# Patient Record
Sex: FEMALE | Race: WHITE
[De-identification: names, ages, dates, MRNs, and addresses within clinical notes are randomized per-mention and may not be internally consistent; named-entity substitution may affect disease eponyms.]

---

## 2017-07-19 ENCOUNTER — HOSPITAL ENCOUNTER (EMERGENCY)
Dept: HOSPITAL 10 - FTE | Age: 27
LOS: 1 days | Discharge: HOME | End: 2017-07-20
Payer: COMMERCIAL

## 2017-07-19 VITALS
WEIGHT: 151.02 LBS | HEIGHT: 64 IN | BODY MASS INDEX: 25.78 KG/M2 | WEIGHT: 151.02 LBS | BODY MASS INDEX: 25.78 KG/M2 | HEIGHT: 64 IN

## 2017-07-19 DIAGNOSIS — R10.84: Primary | ICD-10-CM

## 2017-07-19 DIAGNOSIS — K59.00: ICD-10-CM

## 2017-07-19 DIAGNOSIS — R10.2: ICD-10-CM

## 2017-07-19 LAB
ADD SCAN DIFF: NO
ADD UMIC: YES
ALBUMIN SERPL-MCNC: 4.1 G/DL (ref 3.3–4.9)
ALBUMIN/GLOB SERPL: 1.24 {RATIO}
ALP SERPL-CCNC: 73 IU/L (ref 42–121)
ALT SERPL-CCNC: 36 IU/L (ref 13–69)
ANION GAP SERPL CALC-SCNC: 17 MMOL/L (ref 8–16)
AST SERPL-CCNC: 22 IU/L (ref 15–46)
BASOPHILS # BLD AUTO: 0 10^3/UL (ref 0–0.1)
BASOPHILS NFR BLD: 0.4 % (ref 0–2)
BILIRUB DIRECT SERPL-MCNC: 0 MG/DL (ref 0–0.2)
BILIRUB SERPL-MCNC: 0.3 MG/DL (ref 0.2–1.3)
BUN SERPL-MCNC: 9 MG/DL (ref 7–20)
CALCIUM SERPL-MCNC: 9.1 MG/DL (ref 8.4–10.2)
CHLORIDE SERPL-SCNC: 104 MMOL/L (ref 97–110)
CO2 SERPL-SCNC: 25 MMOL/L (ref 21–31)
COLOR UR: YELLOW
CREAT SERPL-MCNC: 0.63 MG/DL (ref 0.44–1)
EOSINOPHIL # BLD: 0.2 10^3/UL (ref 0–0.5)
EOSINOPHIL NFR BLD: 1.9 % (ref 0–7)
ERYTHROBLAST% (NRBC) (M): 0 /100WBC (ref 0–0)
ERYTHROCYTE [DISTWIDTH] IN BLOOD BY AUTOMATED COUNT: 13.2 % (ref 11.5–14.5)
GLOBULIN SER-MCNC: 3.3 G/DL (ref 1.3–3.2)
GLUCOSE SERPL-MCNC: 94 MG/DL (ref 70–220)
GLUCOSE UR STRIP-MCNC: NEGATIVE MG/DL
HCT VFR BLD CALC: 34.7 % (ref 37–47)
HGB BLD-MCNC: 11.3 G/DL (ref 12–16)
KETONES UR STRIP.AUTO-MCNC: NEGATIVE MG/DL
LYMPHOCYTES # BLD AUTO: 3.6 10^3/UL (ref 0.8–2.9)
LYMPHOCYTES NFR BLD AUTO: 39.5 % (ref 15–51)
MCH RBC QN AUTO: 27.5 PG (ref 29–33)
MCHC RBC AUTO-ENTMCNC: 32.6 G/DL (ref 32–37)
MCV RBC AUTO: 84.4 FL (ref 82–101)
MONOCYTES # BLD: 0.5 10^3/UL (ref 0.3–0.9)
MONOCYTES NFR BLD: 5.4 % (ref 0–11)
NEUTROPHILS # BLD: 4.8 10^3/UL (ref 1.6–7.5)
NEUTROPHILS NFR BLD AUTO: 52.6 % (ref 39–77)
NITRITE UR QL STRIP.AUTO: NEGATIVE MG/DL
NRBC # BLD MANUAL: 0 10^3/UL (ref 0–0)
PLATELET # BLD: 267 10^3/UL (ref 140–415)
PMV BLD AUTO: 10.4 FL (ref 7.4–10.4)
POTASSIUM SERPL-SCNC: 3.8 MMOL/L (ref 3.5–5.1)
PROT SERPL-MCNC: 7.4 G/DL (ref 6.1–8.1)
RBC # BLD AUTO: 4.11 10^6/UL (ref 4.2–5.4)
RBC # UR AUTO: (no result) MG/DL
SODIUM SERPL-SCNC: 142 MMOL/L (ref 135–144)
SQUAMOUS #/AREA URNS HPF: (no result) /HPF
UR ASCORBIC ACID: NEGATIVE MG/DL
UR BILIRUBIN (DIP): NEGATIVE MG/DL
UR CLARITY: CLEAR
UR PH (DIP): 6 (ref 5–9)
UR RBC: 0 /HPF (ref 0–5)
UR SPECIFIC GRAVITY (DIP): 1.01 (ref 1–1.03)
UR TOTAL PROTEIN (DIP): NEGATIVE MG/DL
UROBILINOGEN UR STRIP-ACNC: NEGATIVE MG/DL
WBC # BLD AUTO: 9.1 10^3/UL (ref 4.8–10.8)
WBC # UR STRIP: NEGATIVE LEU/UL

## 2017-07-19 PROCEDURE — 83690 ASSAY OF LIPASE: CPT

## 2017-07-19 PROCEDURE — 81001 URINALYSIS AUTO W/SCOPE: CPT

## 2017-07-19 PROCEDURE — 80053 COMPREHEN METABOLIC PANEL: CPT

## 2017-07-19 PROCEDURE — 36415 COLL VENOUS BLD VENIPUNCTURE: CPT

## 2017-07-19 PROCEDURE — 74176 CT ABD & PELVIS W/O CONTRAST: CPT

## 2017-07-19 PROCEDURE — 85025 COMPLETE CBC W/AUTO DIFF WBC: CPT

## 2017-07-19 PROCEDURE — 76856 US EXAM PELVIC COMPLETE: CPT

## 2017-07-19 NOTE — RADRPT
PROCEDURE:    CT ABDOMEN/PELVIS WITHOUT CONTRAST  

 

CLINICAL INDICATION:   27 year-old female with epigastric and left lower quadrant pain. 

 

TECHNIQUE:   The study was performed utilizing a GE [employer] Software VCT 64-slice CT scanner.  Direct axia
l sections were obtained through the abdomen and pelvis without the use of intravenous contrast mate
rial. Sagittal and coronal reformations were obtained. One or more of the following dose reduction t
echniques were utilized: automated exposure control, adjustment of the mA and/or kV according to pat
ient's size or use of iterative reconstruction technique.  The images were reviewed on a PACS workst
ation.   CTD/vol = 9.6 mGy; Total Exam DLP = 494.6 mGy-cm. 

 

COMPARISON:    None. 

 

FINDINGS:

 

The lung bases are unremarkable.  There is no evidence for significant pleural effusion.  The liver 
has a normal size and contour without focal areas of abnormal density. No intrahepatic nor extrahepa
tic biliary ductal dilatation is seen. Surgical clips are seen within the gallbladder fossa from ridge
or cholecystectomy. The pancreas is without areas of abnormal attenuation.  The spleen is identified
 and has a normal size without abnormal density. The adrenal glands are unremarkable. The kidneys ar
e without abnormal density. No hydroureteronephrosis nor nephroureterolithiasis is evident. The urin
alice bladder contains a small volume of urine. There is mild retained stool within the colon without 
obstruction..  The appendix is visualized and is without abnormal thickening or surrounding inflamma
tory reaction. The uterus appears to be enlarged and elongated measuring approximately 11.5 x 4.7 x 
6.0 cm. There is no significant free fluid.  The aortoiliac vessels are without aneurysmal 

dilatation. Mild degenerative changes are seen within the spine.  There is a Schmorl's node involvin
g the inferior L1 vertebral body.  There appears to be a limbus vertebra within the anterosuperior L
4 vertebral body.

 

IMPRESSION:

 

1.  Status post cholecystectomy.

2.  No CT evidence for obstructive uropathy or renal calculi.

3.  Retained stool without gross bowel obstruction.

4.  Elongated mildly enlarged uterus.

5.  Mild degenerative changes within the spine.

_____________________________________________ 

.Ricky Dominguez MD, MD           Date    Time 

Electronically viewed and signed by .Ricky Dominguez MD, MD on 07/19/2017 23:00 

 

D:  07/19/2017 23:00  T:  07/19/2017 23:00

.SANTHOSH

## 2017-07-19 NOTE — ERD
ER Documentation


Chief Complaint


Date/Time


DATE: 17 


TIME: 21:44


Chief Complaint


upper abd pain radaiting to back x 1 month





HPI


27-year-old female presents in emergency department for complaint of epigastric 

pain that radiates to the back started one month ago, patient saw her primary 

care doctor, was told to possibly have gastritis, patient has an appointment 

with a GI specialist in the end of the month but today, her pain is worse, 8/10 

scale, she's been taking omeprazole with only mild relief. Patient's complete 

of nausea but denies any vomiting. Patient's pain is worse after eating. Today 

also, patient started to have left lower quadrant abdominal pain sharp pains 6/

10 scale, accompanied with the other symptoms. Patient denies any flank pain or 

hematuria or dysuria. Patient denies any constipation or diarrhea. Patient 

denies any blood in stool or black stool.





ROS


All systems reviewed and are negative except as per history of present illness.





Medications


Home Meds


Active Scripts


Ibuprofen* (Motrin*) 600 Mg Tab, 600 MG PO Q8, #20


   Prov:GARETT SALAZAR         10/2/15


Cyclobenzaprine Hcl* (Cyclobenzaprine Hcl*) 10 Mg Tablet, 10 MG PO TID, #15 TAB


   Prov:DAY MOCK         8/23/15


Ibuprofen* (Motrin*) 600 Mg Tab, 600 MG PO Q6, #30 TAB


   Prov:DAY MOCK         8/23/15


Reported Medications


Prenatal Vit-Iron Fumarate-FA (Prenatal Vitamin Tablet) 1 Each Tablet, 1 TAB PO 

DAILY, TAB


   16





Allergies


Allergies:  


Coded Allergies:  


     No Known Allergy (Unverified , 16)





PMhx/Soc


History of Surgery:  Yes (; OVARIAN MASS; LEFT LEG; RIGHT LEG. 

Cholecystectomy)


Anesthesia Reaction:  No


Hx Neurological Disorder:  No


Hx Respiratory Disorders:  No


Hx Cardiac Disorders:  No


Hx Psychiatric Problems:  No


Hx Miscellaneous Medical Probl:  Yes (gallstones)


Hx Alcohol Use:  No


Hx Substance Use:  No


Hx Tobacco Use:  No


Smoking Status:  Never smoker





FmHx


Family History:  No coronary disease, No diabetes, No other





Physical Exam


Vitals





Vital Signs








  Date Time  Temp Pulse Resp B/P Pulse Ox O2 Delivery O2 Flow Rate FiO2


 


17 18:51 98.2 90 20 122/81 100   








Physical Exam


GENERAL:  The patient is well developed and appropriate for usual state of 

health, in no apparent distress.


CHEST:  Clear to auscultation bilaterally. There are no rales, wheezes or 

rhonchi. 


HEART:  Regular rate and rhythm. No murmurs, clicks, rubs or gallops. No S3 or 

S4.


ABDOMEN:  Soft, nontender and nondistended. Good bowel sounds. No rebound or 

guarding. No gross peritonitis. No gross organomegaly or masses. No Parker sign 

or McBurney point tenderness.


BACK:  No midline or flank tenderness.


EXTREMITIES:  Equal pulses bilaterally. There is no peripheral clubbing, 

cyanosis or edema. No focal swelling or erythema. Full range of motion. Grossly 

neurovascularly intact.


NEURO:  Alert and oriented. Cranial nerves 2-12 intact. Motor strength in all 4 

extremities with 5/5 strength.  Sensation grossly intact. Normal speech and 

gait. 


SKIN:  There is no apparent rash or petechia. The skin is warm and dry.


HEMATOLOGIC AND LYMPHATIC:  There is no evidence of excessive bruising or 

lymphedema. No gross cervical, axillary, or inguinal lymphadenopathy.


Result Diagram:  


17





Results 24 hrs





 Laboratory Tests








Test


  17


21:30 17


21:53


 


Urine Color YELLOW  


 


Urine Clarity CLEAR  


 


Urine pH 6.0  


 


Urine Specific Gravity 1.012  


 


Urine Ketones NEGATIVEmg/dL  


 


Urine Nitrite NEGATIVEmg/dL  


 


Urine Bilirubin NEGATIVEmg/dL  


 


Urine Urobilinogen NEGATIVEmg/dL  


 


Urine Leukocyte Esterase NEGATIVELeu/ul  


 


Urine Microscopic RBC 0/HPF  


 


Urine Microscopic WBC 0/HPF  


 


Urine Squamous Epithelial


Cells FEW/HPF 


  


 


 


Urine Hemoglobin 1+mg/dL  


 


Urine Glucose NEGATIVEmg/dL  


 


Urine Total Protein NEGATIVEmg/dl  


 


White Blood Count  9.110^3/ul 


 


Red Blood Count  4.1110^6/ul 


 


Hemoglobin  11.3g/dl 


 


Hematocrit  34.7% 


 


Mean Corpuscular Volume  84.4fl 


 


Mean Corpuscular Hemoglobin  27.5pg 


 


Mean Corpuscular Hemoglobin


Concent 


  32.6g/dl 


 


 


Red Cell Distribution Width  13.2% 


 


Platelet Count  15946^3/UL 


 


Mean Platelet Volume  10.4fl 


 


Neutrophils %  52.6% 


 


Lymphocytes %  39.5% 


 


Monocytes %  5.4% 


 


Eosinophils %  1.9% 


 


Basophils %  0.4% 


 


Nucleated Red Blood Cells %  0.0/100WBC 


 


Neutrophils #  4.810^3/ul 


 


Lymphocytes #  3.610^3/ul 


 


Monocytes #  0.510^3/ul 


 


Eosinophils #  0.210^3/ul 


 


Basophils #  0.010^3/ul 


 


Nucleated Red Blood Cells #  0.010^3/ul 


 


Sodium Level  142mmol/L 


 


Potassium Level  3.8mmol/L 


 


Chloride Level  104mmol/L 


 


Carbon Dioxide Level  25mmol/L 


 


Anion Gap  17 


 


Blood Urea Nitrogen  9mg/dl 


 


Creatinine  0.63mg/dl 


 


Glucose Level  94mg/dl 


 


Calcium Level  9.1mg/dl 


 


Total Bilirubin  0.3mg/dl 


 


Direct Bilirubin  0.00mg/dl 


 


Indirect Bilirubin  0.3mg/dl 


 


Aspartate Amino Transf


(AST/SGOT) 


  22IU/L 


 


 


Alanine Aminotransferase


(ALT/SGPT) 


  36IU/L 


 


 


Alkaline Phosphatase  73IU/L 


 


Total Protein  7.4g/dl 


 


Albumin  4.1g/dl 


 


Globulin  3.30g/dl 


 


Albumin/Globulin Ratio  1.24 


 


Lipase  54U/L 








 Current Medications








 Medications


  (Trade)  Dose


 Ordered  Sig/Larissa


 Route


 PRN Reason  Start Time


 Stop Time Status Last Admin


Dose Admin


 


 Tramadol HCl


  (Ultram)  50 mg  ONCE  ONCE


 PO


   17 23:30


 17 23:31 DC 17 23:28


 





PROCEDURE:    CT ABDOMEN/PELVIS WITHOUT CONTRAST  


 


CLINICAL INDICATION:   27 year-old female with epigastric and left lower 

quadrant pain. 


 


TECHNIQUE:   The study was performed utilizing a GE LightSpeed VCT 64-slice CT 

scanner.  Direct axial sections were obtained through the abdomen and pelvis 

without the use of intravenous contrast material. Sagittal and coronal 

reformations were obtained. One or more of the following dose reduction 

techniques were utilized: automated exposure control, adjustment of the mA and/

or kV according to patient's size or use of iterative reconstruction technique.

  The images were reviewed on a PACS workstation.   CTD/vol = 9.6 mGy; Total 

Exam DLP = 494.6 mGy-cm. 


 


COMPARISON:    None. 


 


FINDINGS:


 


The lung bases are unremarkable.  There is no evidence for significant pleural 

effusion.  The liver has a normal size and contour without focal areas of 

abnormal density. No intrahepatic nor extrahepatic biliary ductal dilatation is 

seen. Surgical clips are seen within the gallbladder fossa from prior 

cholecystectomy. The pancreas is without areas of abnormal attenuation.  The 

spleen is identified and has a normal size without abnormal density. The 

adrenal glands are unremarkable. The kidneys are without abnormal density. No 

hydroureteronephrosis nor nephroureterolithiasis is evident. The urinary 

bladder contains a small volume of urine. There is mild retained stool within 

the colon without obstruction..  The appendix is visualized and is without 

abnormal thickening or surrounding inflammatory reaction. The uterus appears to 

be enlarged and elongated measuring approximately 11.5 x 4.7 x 6.0 cm. There is 

no significant free fluid.  The aortoiliac vessels are without aneurysmal 


dilatation. Mild degenerative changes are seen within the spine.  There is a 

Schmorl's node involving the inferior L1 vertebral body.  There appears to be a 

limbus vertebra within the anterosuperior L4 vertebral body.


 


IMPRESSION:


 


1.  Status post cholecystectomy.


2.  No CT evidence for obstructive uropathy or renal calculi.


3.  Retained stool without gross bowel obstruction.


4.  Elongated mildly enlarged uterus.


5.  Mild degenerative changes within the spine.


_____________________________________________ 


.Ricky Dominguez MD, MD           Date    Time 


Electronically viewed and signed by .Ricky Dominguez MD, MD on 2017 23:00 


 


D:  2017 23:00  T:  2017 23:00


.M/





CC: MADHURI IVAN NP


PROCEDURE:   US Pelvis. 


 


CLINICAL INDICATION:   Pelvic pain


 


TECHNIQUE:   Multiple sonographic images of the pelvis were obtained utilizing 

a transabdominal  technique.   The images were reviewed on a PACS workstation. 


 


COMPARISON:   CT abdomen and pelvis 2017


 


FINDINGS:


 


Uterus: Elongated but without evidence of mass and homogeneous normal 

echotexture  Size is estimated at 12.6 x 5.9 x 4 cm.


 


Cervix:  No abnormalities of significance are seen.


 


Endometrium:  Normal in thickness; 9.8 mm. Trace amount of anechoic fluid is 

present


 


Right ovary / adnexa:  Normal in size estimated at 3.5 x 2.7 x 2 cm.  No 

evidence for masses, normal blood flow on Doppler interrogation.


 


Left ovary/adnexa: Normal in size estimated at 4.7 x 3.4 x 2.5 cm.  No evidence 

for solid masses, normal blood flow on Doppler interrogation. Simple ovarian 

cyst estimated at 2 x 1.8 x 1.7


 


Cul-de-sac:  No evidence of free fluid.  


 


 


RPTAT:HJJR


 


IMPRESSION:


 


1.  Elongated uterus corresponds to the CT findings but there is no evidence of 

myometrial mass.


2.  Simple left ovarian cyst of 2 cm without evidence of free fluid.


_____________________________________________ 


Physician Kaylyn           Date    Time 


Electronically viewed and signed by Gentry Barreto Physician on 2017 00:22 


 


D:  2017 00:22  T:  2017 00:22


JR/





CC: MADHURI IVAN NP





Procedures/MDM


Medical Decision Making: Patient epigastric pain most likely consistent with 

gastritis, patient has an appointment with GI specialist at the end of the 

month for further evaluation and possible EGD. No symptoms of pancreatitis, no 

symptoms of choledocholithiasis. Liver function symptoms are normal, lipase is 

normal. Nonspecific left lower quadrant abdominal pain, can be radiation from 

the gastritis, no diverticulitis noted. Patient is also constipated which may 

be causing her pain. There is low suspicion for abdominal emergencies at this 

time. Patients abdominal exam is normal at this time. Patients radiology exam 

does not show any abdominal emergencies at this time. There is low suspicion 

for appendicitis, cholecystitis, abdominal aortic aneurysms or peritonitis at 

this time.  There is low suspicion for sepsis. Patient appears well and is 

hemodynamically stable.





Disposition: Home. Condition: Stable


Prescription tramadol, Colace, MiraLAX, Mylanta, continue omeprazole


Instructions: Patient is advised to take medications as prescribed. Patient is 

advised to rest, increase fluid intake and do brat diet for next 1-2 days and 

progress as tolerated. Patient is advised that if symptoms are worse, severe 

abdominal pain, uncontrolled vomiting, high fever, severe flank pain, worst 

signs and symptoms, to return to the emergency department immediately.  

Otherwise, patient can follow up with primary care doctor in 5-7 days. see a GI 

specialist as per appointment.








Disclaimer: Inadvertent spelling and grammatical errors are likely due to EHR/

dictation software use and do not reflect on the overall quality of patient 

care. Also, please note that the electronic time recorded on this note does not 

necessarily reflect the actual time of the patient encounter.





Departure


Diagnosis:  


 Primary Impression:  


 Abdominal pain


 Abdominal location:  generalized  Qualified Code:  R10.84 - Generalized 

abdominal pain


 Additional Impression:  


 Constipation


 Constipation type:  unspecified constipation type  Qualified Code:  K59.00 - 

Constipation, unspecified constipation type


Condition:  Stable


Patient Instructions:  Abdominal Pain, Constipation (Adult), Epigastric Pain (

Uncertain Cause)





Additional Instructions:  


Patient is advised to take medications as prescribed. Patient is advised to rest

, increase fluid intake and do brat diet for next 1-2 days and progress as 

tolerated. Patient is advised that if symptoms are worse, severe abdominal pain

, uncontrolled vomiting, high fever, severe flank pain, worst signs and symptoms

, to return to the emergency department immediately.  Otherwise, patient can 

follow up with primary care doctor in 5-7 days. see a GI specialist











MADHURI IVAN NP 2017 21:45

## 2017-07-20 VITALS — RESPIRATION RATE: 16 BRPM | HEART RATE: 74 BPM | SYSTOLIC BLOOD PRESSURE: 141 MMHG | DIASTOLIC BLOOD PRESSURE: 67 MMHG

## 2017-07-20 NOTE — RADRPT
PROCEDURE:   US Pelvis. 

 

CLINICAL INDICATION:   Pelvic pain

 

TECHNIQUE:   Multiple sonographic images of the pelvis were obtained utilizing a transabdominal  tram
hnique.   The images were reviewed on a PACS workstation. 

 

COMPARISON:   CT abdomen and pelvis 07/19/2017

 

FINDINGS:

 

Uterus: Elongated but without evidence of mass and homogeneous normal echotexture  Size is estimated
 at 12.6 x 5.9 x 4 cm.

 

Cervix:  No abnormalities of significance are seen.

 

Endometrium:  Normal in thickness; 9.8 mm. Trace amount of anechoic fluid is present

 

Right ovary / adnexa:  Normal in size estimated at 3.5 x 2.7 x 2 cm.  No evidence for masses, normal
 blood flow on Doppler interrogation.

 

Left ovary/adnexa: Normal in size estimated at 4.7 x 3.4 x 2.5 cm.  No evidence for solid masses, no
rmal blood flow on Doppler interrogation. Simple ovarian cyst estimated at 2 x 1.8 x 1.7

 

Cul-de-sac:  No evidence of free fluid.  

 

 

RPTAT:HJJR

 

IMPRESSION:

 

1.  Elongated uterus corresponds to the CT findings but there is no evidence of myometrial mass.

2.  Simple left ovarian cyst of 2 cm without evidence of free fluid.

_____________________________________________ 

Physician Kaylyn           Date    Time 

Electronically viewed and signed by Physician Kaylyn on 07/20/2017 00:22 

 

D:  07/20/2017 00:22  T:  07/20/2017 00:22

JR/

## 2017-08-29 ENCOUNTER — HOSPITAL ENCOUNTER (OUTPATIENT)
Dept: HOSPITAL 10 - GIL | Age: 27
Discharge: HOME | End: 2017-08-29
Attending: INTERNAL MEDICINE
Payer: COMMERCIAL

## 2017-08-29 VITALS — HEART RATE: 68 BPM | DIASTOLIC BLOOD PRESSURE: 73 MMHG | RESPIRATION RATE: 22 BRPM | SYSTOLIC BLOOD PRESSURE: 118 MMHG

## 2017-08-29 VITALS — RESPIRATION RATE: 14 BRPM | DIASTOLIC BLOOD PRESSURE: 60 MMHG | SYSTOLIC BLOOD PRESSURE: 113 MMHG | HEART RATE: 69 BPM

## 2017-08-29 VITALS — BODY MASS INDEX: 28.72 KG/M2 | WEIGHT: 152.12 LBS | HEIGHT: 61 IN

## 2017-08-29 DIAGNOSIS — K29.60: Primary | ICD-10-CM

## 2017-08-29 PROCEDURE — 84703 CHORIONIC GONADOTROPIN ASSAY: CPT

## 2017-08-29 PROCEDURE — 87081 CULTURE SCREEN ONLY: CPT

## 2017-08-29 PROCEDURE — 43239 EGD BIOPSY SINGLE/MULTIPLE: CPT

## 2017-08-29 NOTE — GILP
DATE OF PROCEDURE:  08/29/2017

 

NAME OF PROCEDURE:  Esophagogastroduodenoscopy and biopsy.

 

SURGEON:  Bryanna Hahn MD

 

PREOPERATIVE DIAGNOSIS:  Abdominal pain.

 

POSTOPERATIVE DIAGNOSES:

1.   Gastritis with erosions.

2.   Gastric mucosal biopsies were taken for Helicobacter pylori

test.

 

INDICATION:  The patient is a 27-year-old female patient who had

upper abdominal pain, not responding to therapy.  The patient was

scheduled for endoscopic examination for further evaluation.

 

The procedure and possible complications were well explained to

the patient.  The patient understood and consented to the

procedure.

 

DESCRIPTION OF PROCEDURE:  Under the influence of fentanyl and

Versed, the gastroscope was carefully introduced into the

esophagus.  Under direct vision, it was advanced to the stomach,

into the pylorus, into the duodenal bulb, and descending

duodenum.

 

FINDINGS:  Esophagus, mucosa was normal.  Stomach, the patient

had gastritis with erosions.  Gastric mucosal biopsies were taken

for Helicobacter pylori test.  Duodenum was normal.

 

She tolerated the procedure very well.  There was no complication

from the procedure.  At the end of procedure, she was awake with

stable vital signs and she was discharged home in the care of her

family.

 

IMPRESSION:

1.   Gastritis with erosions.

2.   Gastric mucosal biopsies were taken for Helicobacter pylori

test.

 

PLAN:

1.   Continue omeprazole.

2.   Add Zantac 300 mg p.o. q.h.s.

3.   Await Helicobacter pylori test report.

 

 

 

Dictated By:  MD VIDHI Cordova/[place]/ak

Job#:  [id number]/Document#:  [id number]

D:  08/29/2017 07:50

T:  08/29/2017 08:09

## 2017-08-29 NOTE — OPPN
Date/Time of Note


Date/Time of Note


DATE: 8/29/17 


TIME: 07:46





Operative Report


Preoperative Diagnosis


Abdominal pain


Postoperative Diagnosis


Gastritis with erosion


Operation/Procedure Performed


Esophagogastroduodenoscopy and biopsy


Provider:  VETO PEREZ MD


Anesthesia Type:  moderate sedation


Estimated blood loss:  none


Transfusion Required:  no


Specimens


Gastric mucosal biopsy


Grafts/Implants:  none


Complications:  no











VETO PEREZ MD Aug 29, 2017 07:47

## 2018-01-01 ENCOUNTER — HOSPITAL ENCOUNTER (EMERGENCY)
Dept: HOSPITAL 91 - FTE | Age: 28
LOS: 1 days | Discharge: HOME | End: 2018-01-02
Payer: COMMERCIAL

## 2018-01-01 ENCOUNTER — HOSPITAL ENCOUNTER (EMERGENCY)
Age: 28
LOS: 1 days | Discharge: HOME | End: 2018-01-02

## 2018-01-01 DIAGNOSIS — R11.0: ICD-10-CM

## 2018-01-01 DIAGNOSIS — R10.84: Primary | ICD-10-CM

## 2018-01-01 LAB
ADD MAN DIFF?: NO
ADD UMIC: YES
ALANINE AMINOTRANSFERASE: 40 IU/L (ref 13–69)
ALBUMIN/GLOBULIN RATIO: 1.39
ALBUMIN: 4.6 G/DL (ref 3.3–4.9)
ALKALINE PHOSPHATASE: 82 IU/L (ref 42–121)
ANION GAP: 17 (ref 8–16)
ASPARTATE AMINO TRANSFERASE: 31 IU/L (ref 15–46)
BASOPHIL #: 0 10^3/UL (ref 0–0.1)
BASOPHILS %: 0.2 % (ref 0–2)
BILIRUBIN,DIRECT: 0 MG/DL (ref 0–0.2)
BILIRUBIN,TOTAL: 0.4 MG/DL (ref 0.2–1.3)
BLOOD UREA NITROGEN: 13 MG/DL (ref 7–20)
CALCIUM: 9.2 MG/DL (ref 8.4–10.2)
CARBON DIOXIDE: 24 MMOL/L (ref 21–31)
CHLORIDE: 106 MMOL/L (ref 97–110)
CREATININE: 0.73 MG/DL (ref 0.44–1)
EOSINOPHILS #: 0.1 10^3/UL (ref 0–0.5)
EOSINOPHILS %: 0.5 % (ref 0–7)
GLOBULIN: 3.3 G/DL (ref 1.3–3.2)
GLUCOSE: 104 MG/DL (ref 70–220)
HEMATOCRIT: 37.3 % (ref 37–47)
HEMOGLOBIN: 12.5 G/DL (ref 12–16)
LIPASE: 59 U/L (ref 23–300)
LYMPHOCYTES #: 2.8 10^3/UL (ref 0.8–2.9)
LYMPHOCYTES %: 29.3 % (ref 15–51)
MEAN CORPUSCULAR HEMOGLOBIN: 28.1 PG (ref 29–33)
MEAN CORPUSCULAR HGB CONC: 33.5 G/DL (ref 32–37)
MEAN CORPUSCULAR VOLUME: 83.8 FL (ref 82–101)
MEAN PLATELET VOLUME: 10.4 FL (ref 7.4–10.4)
MONOCYTE #: 0.5 10^3/UL (ref 0.3–0.9)
MONOCYTES %: 5.5 % (ref 0–11)
NEUTROPHIL #: 6 10^3/UL (ref 1.6–7.5)
NEUTROPHILS %: 64.2 % (ref 39–77)
NUCLEATED RED BLOOD CELLS #: 0 10^3/UL (ref 0–0)
NUCLEATED RED BLOOD CELLS%: 0 /100WBC (ref 0–0)
PLATELET COUNT: 248 10^3/UL (ref 140–415)
POTASSIUM: 4.4 MMOL/L (ref 3.5–5.1)
RED BLOOD COUNT: 4.45 10^6/UL (ref 4.2–5.4)
RED CELL DISTRIBUTION WIDTH: 13.2 % (ref 11.5–14.5)
SODIUM: 143 MMOL/L (ref 135–144)
TOTAL PROTEIN: 7.9 G/DL (ref 6.1–8.1)
UR ASCORBIC ACID: 40 MG/DL
UR BILIRUBIN (DIP): NEGATIVE MG/DL
UR BLOOD (DIP): NEGATIVE MG/DL
UR CLARITY: (no result)
UR COLOR: YELLOW
UR GLUCOSE (DIP): NEGATIVE MG/DL
UR KETONES (DIP): (no result) MG/DL
UR LEUKOCYTE ESTERASE (DIP): NEGATIVE LEU/UL
UR MUCUS: (no result) /HPF
UR NITRITE (DIP): NEGATIVE MG/DL
UR PH (DIP): 6 (ref 5–9)
UR RBC: 9 /HPF (ref 0–5)
UR SPECIFIC GRAVITY (DIP): 1.03 (ref 1–1.03)
UR SQUAMOUS EPITHELIAL CELL: (no result) /HPF
UR TOTAL PROTEIN (DIP): (no result) MG/DL
UR UROBILINOGEN (DIP): NEGATIVE MG/DL
UR WBC: 1 /HPF (ref 0–5)
WHITE BLOOD COUNT: 9.4 10^3/UL (ref 4.8–10.8)

## 2018-01-01 PROCEDURE — 85025 COMPLETE CBC W/AUTO DIFF WBC: CPT

## 2018-01-01 PROCEDURE — 80053 COMPREHEN METABOLIC PANEL: CPT

## 2018-01-01 PROCEDURE — 83690 ASSAY OF LIPASE: CPT

## 2018-01-01 PROCEDURE — 99284 EMERGENCY DEPT VISIT MOD MDM: CPT

## 2018-01-01 PROCEDURE — 81001 URINALYSIS AUTO W/SCOPE: CPT

## 2018-01-01 PROCEDURE — 36415 COLL VENOUS BLD VENIPUNCTURE: CPT

## 2018-01-01 PROCEDURE — 74176 CT ABD & PELVIS W/O CONTRAST: CPT

## 2018-04-26 ENCOUNTER — HOSPITAL ENCOUNTER (EMERGENCY)
Age: 28
LOS: 1 days | Discharge: LEFT BEFORE DISCHARGE | End: 2018-04-27

## 2018-04-26 ENCOUNTER — HOSPITAL ENCOUNTER (EMERGENCY)
Dept: HOSPITAL 91 - FTE | Age: 28
LOS: 1 days | Discharge: LEFT BEFORE DISCHARGE | End: 2018-04-27
Payer: SELF-PAY

## 2018-04-26 DIAGNOSIS — Z53.21: Primary | ICD-10-CM

## 2018-09-25 ENCOUNTER — HOSPITAL ENCOUNTER (EMERGENCY)
Dept: HOSPITAL 91 - FTE | Age: 28
Discharge: HOME | End: 2018-09-25
Payer: COMMERCIAL

## 2018-09-25 ENCOUNTER — HOSPITAL ENCOUNTER (EMERGENCY)
Age: 28
Discharge: HOME | End: 2018-09-25

## 2018-09-25 DIAGNOSIS — R10.9: Primary | ICD-10-CM

## 2018-09-25 LAB
ADD MAN DIFF?: NO
ADD UMIC: YES
ALANINE AMINOTRANSFERASE: 36 IU/L (ref 13–69)
ALBUMIN/GLOBULIN RATIO: 1.11
ALBUMIN: 4 G/DL (ref 3.3–4.9)
ALKALINE PHOSPHATASE: 81 IU/L (ref 42–121)
ANION GAP: 14 (ref 8–16)
ASPARTATE AMINO TRANSFERASE: 31 IU/L (ref 15–46)
BASOPHIL #: 0 10^3/UL (ref 0–0.1)
BASOPHILS %: 0.5 % (ref 0–2)
BILIRUBIN,DIRECT: 0 MG/DL (ref 0–0.2)
BILIRUBIN,TOTAL: 1.1 MG/DL (ref 0.2–1.3)
BLOOD UREA NITROGEN: 9 MG/DL (ref 7–20)
CALCIUM: 9.2 MG/DL (ref 8.4–10.2)
CARBON DIOXIDE: 27 MMOL/L (ref 21–31)
CHLORIDE: 103 MMOL/L (ref 97–110)
CREATININE: 0.59 MG/DL (ref 0.44–1)
EOSINOPHILS #: 0.1 10^3/UL (ref 0–0.5)
EOSINOPHILS %: 1 % (ref 0–7)
GLOBULIN: 3.6 G/DL (ref 1.3–3.2)
GLUCOSE: 105 MG/DL (ref 70–220)
HEMATOCRIT: 38 % (ref 37–47)
HEMOGLOBIN: 12.3 G/DL (ref 12–16)
IMMATURE GRANS #M: 0.02 10^3/UL (ref 0–0.03)
IMMATURE GRANS % (M): 0.3 % (ref 0–0.43)
LIPASE: 41 U/L (ref 23–300)
LYMPHOCYTES #: 2 10^3/UL (ref 0.8–2.9)
LYMPHOCYTES %: 32.3 % (ref 15–51)
MEAN CORPUSCULAR HEMOGLOBIN: 27.3 PG (ref 29–33)
MEAN CORPUSCULAR HGB CONC: 32.4 G/DL (ref 32–37)
MEAN CORPUSCULAR VOLUME: 84.4 FL (ref 82–101)
MEAN PLATELET VOLUME: 10.2 FL (ref 7.4–10.4)
MONOCYTE #: 0.4 10^3/UL (ref 0.3–0.9)
MONOCYTES %: 6.5 % (ref 0–11)
NEUTROPHIL #: 3.7 10^3/UL (ref 1.6–7.5)
NEUTROPHILS %: 59.4 % (ref 39–77)
NUCLEATED RED BLOOD CELLS #: 0 10^3/UL (ref 0–0)
NUCLEATED RED BLOOD CELLS%: 0 /100WBC (ref 0–0)
PLATELET COUNT: 235 10^3/UL (ref 140–415)
POTASSIUM: 3.4 MMOL/L (ref 3.5–5.1)
RED BLOOD COUNT: 4.5 10^6/UL (ref 4.2–5.4)
RED CELL DISTRIBUTION WIDTH: 13.4 % (ref 11.5–14.5)
SODIUM: 141 MMOL/L (ref 135–144)
TOTAL PROTEIN: 7.6 G/DL (ref 6.1–8.1)
UR ASCORBIC ACID: NEGATIVE MG/DL
UR BACTERIA: (no result) /HPF
UR BILIRUBIN (DIP): NEGATIVE MG/DL
UR BLOOD (DIP): (no result) MG/DL
UR CLARITY: (no result)
UR COLOR: YELLOW
UR GLUCOSE (DIP): NEGATIVE MG/DL
UR KETONES (DIP): NEGATIVE MG/DL
UR LEUKOCYTE ESTERASE (DIP): NEGATIVE LEU/UL
UR MUCUS: (no result) /HPF
UR NITRITE (DIP): NEGATIVE MG/DL
UR PH (DIP): 6 (ref 5–9)
UR RBC: 5 /HPF (ref 0–5)
UR SPECIFIC GRAVITY (DIP): 1.01 (ref 1–1.03)
UR SQUAMOUS EPITHELIAL CELL: (no result) /HPF
UR TOTAL PROTEIN (DIP): NEGATIVE MG/DL
UR UROBILINOGEN (DIP): NEGATIVE MG/DL
UR WBC: 2 /HPF (ref 0–5)
WHITE BLOOD COUNT: 6.3 10^3/UL (ref 4.8–10.8)

## 2018-09-25 PROCEDURE — 96376 TX/PRO/DX INJ SAME DRUG ADON: CPT

## 2018-09-25 PROCEDURE — 99285 EMERGENCY DEPT VISIT HI MDM: CPT

## 2018-09-25 PROCEDURE — 80053 COMPREHEN METABOLIC PANEL: CPT

## 2018-09-25 PROCEDURE — 96375 TX/PRO/DX INJ NEW DRUG ADDON: CPT

## 2018-09-25 PROCEDURE — 36415 COLL VENOUS BLD VENIPUNCTURE: CPT

## 2018-09-25 PROCEDURE — 96374 THER/PROPH/DIAG INJ IV PUSH: CPT

## 2018-09-25 PROCEDURE — 83690 ASSAY OF LIPASE: CPT

## 2018-09-25 PROCEDURE — 81001 URINALYSIS AUTO W/SCOPE: CPT

## 2018-09-25 PROCEDURE — 85025 COMPLETE CBC W/AUTO DIFF WBC: CPT

## 2018-09-25 PROCEDURE — 74176 CT ABD & PELVIS W/O CONTRAST: CPT

## 2018-09-25 PROCEDURE — 84703 CHORIONIC GONADOTROPIN ASSAY: CPT

## 2018-09-25 PROCEDURE — 96361 HYDRATE IV INFUSION ADD-ON: CPT

## 2018-09-25 RX ADMIN — KETOROLAC TROMETHAMINE 1 MG: 30 INJECTION, SOLUTION INTRAMUSCULAR at 10:11

## 2018-09-25 RX ADMIN — ONDANSETRON HYDROCHLORIDE 1 MG: 2 INJECTION, SOLUTION INTRAMUSCULAR; INTRAVENOUS at 09:20

## 2018-09-25 RX ADMIN — ONDANSETRON HYDROCHLORIDE 1 MG: 2 INJECTION, SOLUTION INTRAMUSCULAR; INTRAVENOUS at 10:36

## 2018-09-25 RX ADMIN — THIAMINE HYDROCHLORIDE 1 MLS/HR: 100 INJECTION, SOLUTION INTRAMUSCULAR; INTRAVENOUS at 09:20

## 2023-08-01 ENCOUNTER — HOSPITAL ENCOUNTER (EMERGENCY)
Dept: HOSPITAL 54 - ER | Age: 33
Discharge: HOME | End: 2023-08-01
Payer: COMMERCIAL

## 2023-08-01 VITALS — DIASTOLIC BLOOD PRESSURE: 80 MMHG | OXYGEN SATURATION: 97 % | TEMPERATURE: 98.3 F | SYSTOLIC BLOOD PRESSURE: 113 MMHG

## 2023-08-01 VITALS — WEIGHT: 150 LBS | BODY MASS INDEX: 29.45 KG/M2 | HEIGHT: 60 IN

## 2023-08-01 DIAGNOSIS — Z60.2: ICD-10-CM

## 2023-08-01 DIAGNOSIS — M54.41: Primary | ICD-10-CM

## 2023-08-01 PROCEDURE — 99283 EMERGENCY DEPT VISIT LOW MDM: CPT

## 2023-08-01 PROCEDURE — 96372 THER/PROPH/DIAG INJ SC/IM: CPT

## 2023-08-01 SDOH — SOCIAL STABILITY - SOCIAL INSECURITY: PROBLEMS RELATED TO LIVING ALONE: Z60.2
